# Patient Record
Sex: MALE | Race: WHITE | Employment: FULL TIME | URBAN - METROPOLITAN AREA
[De-identification: names, ages, dates, MRNs, and addresses within clinical notes are randomized per-mention and may not be internally consistent; named-entity substitution may affect disease eponyms.]

---

## 2021-11-18 ENCOUNTER — APPOINTMENT (OUTPATIENT)
Dept: GENERAL RADIOLOGY | Age: 51
DRG: 177 | End: 2021-11-18
Payer: COMMERCIAL

## 2021-11-18 ENCOUNTER — HOSPITAL ENCOUNTER (INPATIENT)
Age: 51
LOS: 1 days | Discharge: HOME | DRG: 177 | End: 2021-11-19
Attending: STUDENT IN AN ORGANIZED HEALTH CARE EDUCATION/TRAINING PROGRAM | Admitting: INTERNAL MEDICINE
Payer: COMMERCIAL

## 2021-11-18 DIAGNOSIS — J96.01 ACUTE RESPIRATORY FAILURE WITH HYPOXIA (HCC): Primary | ICD-10-CM

## 2021-11-18 DIAGNOSIS — U07.1 PNEUMONIA DUE TO COVID-19 VIRUS: ICD-10-CM

## 2021-11-18 DIAGNOSIS — J12.82 PNEUMONIA DUE TO COVID-19 VIRUS: ICD-10-CM

## 2021-11-18 PROBLEM — Z99.89 OSA ON CPAP: Status: ACTIVE | Noted: 2021-11-18

## 2021-11-18 PROBLEM — I10 PRIMARY HYPERTENSION: Status: ACTIVE | Noted: 2021-11-18

## 2021-11-18 PROBLEM — E66.01 CLASS 3 SEVERE OBESITY DUE TO EXCESS CALORIES WITH SERIOUS COMORBIDITY AND BODY MASS INDEX (BMI) OF 45.0 TO 49.9 IN ADULT (HCC): Status: ACTIVE | Noted: 2021-11-18

## 2021-11-18 PROBLEM — G47.33 OSA ON CPAP: Status: ACTIVE | Noted: 2021-11-18

## 2021-11-18 LAB
A/G RATIO: 1 (ref 1.1–2.2)
ALBUMIN SERPL-MCNC: 3.6 G/DL (ref 3.4–5)
ALP BLD-CCNC: 77 U/L (ref 40–129)
ALT SERPL-CCNC: 50 U/L (ref 10–40)
ANION GAP SERPL CALCULATED.3IONS-SCNC: 12 MMOL/L (ref 3–16)
AST SERPL-CCNC: 46 U/L (ref 15–37)
BASE EXCESS VENOUS: 4.3 MMOL/L (ref -3–3)
BASOPHILS ABSOLUTE: 0 K/UL (ref 0–0.2)
BASOPHILS RELATIVE PERCENT: 0.5 %
BILIRUB SERPL-MCNC: 0.5 MG/DL (ref 0–1)
BUN BLDV-MCNC: 17 MG/DL (ref 7–20)
C-REACTIVE PROTEIN: 15.3 MG/L (ref 0–5.1)
CALCIUM SERPL-MCNC: 8.6 MG/DL (ref 8.3–10.6)
CARBOXYHEMOGLOBIN: 1.3 % (ref 0–1.5)
CHLORIDE BLD-SCNC: 100 MMOL/L (ref 99–110)
CO2: 26 MMOL/L (ref 21–32)
CREAT SERPL-MCNC: 0.7 MG/DL (ref 0.9–1.3)
EKG ATRIAL RATE: 102 BPM
EKG DIAGNOSIS: NORMAL
EKG P AXIS: 21 DEGREES
EKG P-R INTERVAL: 152 MS
EKG Q-T INTERVAL: 374 MS
EKG QRS DURATION: 100 MS
EKG QTC CALCULATION (BAZETT): 487 MS
EKG R AXIS: 71 DEGREES
EKG T AXIS: 27 DEGREES
EKG VENTRICULAR RATE: 102 BPM
EOSINOPHILS ABSOLUTE: 0 K/UL (ref 0–0.6)
EOSINOPHILS RELATIVE PERCENT: 0.2 %
GFR AFRICAN AMERICAN: >60
GFR NON-AFRICAN AMERICAN: >60
GLUCOSE BLD-MCNC: 109 MG/DL (ref 70–99)
HCO3 VENOUS: 28.8 MMOL/L (ref 23–29)
HCT VFR BLD CALC: 53.1 % (ref 40.5–52.5)
HEMOGLOBIN: 17.2 G/DL (ref 13.5–17.5)
INFLUENZA A: NOT DETECTED
INFLUENZA B: NOT DETECTED
LACTIC ACID: 0.9 MMOL/L (ref 0.4–2)
LYMPHOCYTES ABSOLUTE: 1.3 K/UL (ref 1–5.1)
LYMPHOCYTES RELATIVE PERCENT: 28.6 %
MAGNESIUM: 2 MG/DL (ref 1.8–2.4)
MCH RBC QN AUTO: 28.4 PG (ref 26–34)
MCHC RBC AUTO-ENTMCNC: 32.4 G/DL (ref 31–36)
MCV RBC AUTO: 87.4 FL (ref 80–100)
METHEMOGLOBIN VENOUS: 0.3 %
MONOCYTES ABSOLUTE: 0.7 K/UL (ref 0–1.3)
MONOCYTES RELATIVE PERCENT: 14.3 %
NEUTROPHILS ABSOLUTE: 2.6 K/UL (ref 1.7–7.7)
NEUTROPHILS RELATIVE PERCENT: 56.4 %
O2 SAT, VEN: 92 %
O2 THERAPY: ABNORMAL
PCO2, VEN: 42.3 MMHG (ref 40–50)
PDW BLD-RTO: 15.1 % (ref 12.4–15.4)
PH VENOUS: 7.45 (ref 7.35–7.45)
PLATELET # BLD: 163 K/UL (ref 135–450)
PMV BLD AUTO: 8.6 FL (ref 5–10.5)
PO2, VEN: 58.7 MMHG (ref 25–40)
POTASSIUM REFLEX MAGNESIUM: 3.4 MMOL/L (ref 3.5–5.1)
PRO-BNP: 23 PG/ML (ref 0–124)
RBC # BLD: 6.07 M/UL (ref 4.2–5.9)
SARS-COV-2 RNA, RT PCR: DETECTED
SODIUM BLD-SCNC: 138 MMOL/L (ref 136–145)
TCO2 CALC VENOUS: 30 MMOL/L
TOTAL PROTEIN: 7.1 G/DL (ref 6.4–8.2)
TROPONIN: <0.01 NG/ML
WBC # BLD: 4.7 K/UL (ref 4–11)

## 2021-11-18 PROCEDURE — 93010 ELECTROCARDIOGRAM REPORT: CPT | Performed by: INTERNAL MEDICINE

## 2021-11-18 PROCEDURE — G0378 HOSPITAL OBSERVATION PER HR: HCPCS

## 2021-11-18 PROCEDURE — 96374 THER/PROPH/DIAG INJ IV PUSH: CPT

## 2021-11-18 PROCEDURE — 93005 ELECTROCARDIOGRAM TRACING: CPT | Performed by: PHYSICIAN ASSISTANT

## 2021-11-18 PROCEDURE — 71045 X-RAY EXAM CHEST 1 VIEW: CPT

## 2021-11-18 PROCEDURE — 83880 ASSAY OF NATRIURETIC PEPTIDE: CPT

## 2021-11-18 PROCEDURE — 6370000000 HC RX 637 (ALT 250 FOR IP): Performed by: NURSE PRACTITIONER

## 2021-11-18 PROCEDURE — 80053 COMPREHEN METABOLIC PANEL: CPT

## 2021-11-18 PROCEDURE — 84484 ASSAY OF TROPONIN QUANT: CPT

## 2021-11-18 PROCEDURE — 6360000002 HC RX W HCPCS: Performed by: NURSE PRACTITIONER

## 2021-11-18 PROCEDURE — 2580000003 HC RX 258: Performed by: NURSE PRACTITIONER

## 2021-11-18 PROCEDURE — 36415 COLL VENOUS BLD VENIPUNCTURE: CPT

## 2021-11-18 PROCEDURE — 83605 ASSAY OF LACTIC ACID: CPT

## 2021-11-18 PROCEDURE — 85025 COMPLETE CBC W/AUTO DIFF WBC: CPT

## 2021-11-18 PROCEDURE — 82803 BLOOD GASES ANY COMBINATION: CPT

## 2021-11-18 PROCEDURE — 99282 EMERGENCY DEPT VISIT SF MDM: CPT

## 2021-11-18 PROCEDURE — 6360000002 HC RX W HCPCS: Performed by: PHYSICIAN ASSISTANT

## 2021-11-18 PROCEDURE — 87636 SARSCOV2 & INF A&B AMP PRB: CPT

## 2021-11-18 PROCEDURE — 1200000000 HC SEMI PRIVATE

## 2021-11-18 PROCEDURE — 99221 1ST HOSP IP/OBS SF/LOW 40: CPT | Performed by: NURSE PRACTITIONER

## 2021-11-18 PROCEDURE — 86140 C-REACTIVE PROTEIN: CPT

## 2021-11-18 PROCEDURE — 83735 ASSAY OF MAGNESIUM: CPT

## 2021-11-18 PROCEDURE — 96372 THER/PROPH/DIAG INJ SC/IM: CPT

## 2021-11-18 RX ORDER — SODIUM CHLORIDE 0.9 % (FLUSH) 0.9 %
5-40 SYRINGE (ML) INJECTION EVERY 12 HOURS SCHEDULED
Status: DISCONTINUED | OUTPATIENT
Start: 2021-11-18 | End: 2021-11-19 | Stop reason: HOSPADM

## 2021-11-18 RX ORDER — ONDANSETRON 4 MG/1
4 TABLET, ORALLY DISINTEGRATING ORAL EVERY 8 HOURS PRN
Status: DISCONTINUED | OUTPATIENT
Start: 2021-11-18 | End: 2021-11-19 | Stop reason: HOSPADM

## 2021-11-18 RX ORDER — DEXAMETHASONE SODIUM PHOSPHATE 10 MG/ML
6 INJECTION INTRAMUSCULAR; INTRAVENOUS ONCE
Status: COMPLETED | OUTPATIENT
Start: 2021-11-18 | End: 2021-11-18

## 2021-11-18 RX ORDER — ACETAMINOPHEN 650 MG/1
650 SUPPOSITORY RECTAL EVERY 6 HOURS PRN
Status: DISCONTINUED | OUTPATIENT
Start: 2021-11-18 | End: 2021-11-19 | Stop reason: HOSPADM

## 2021-11-18 RX ORDER — SODIUM CHLORIDE 0.9 % (FLUSH) 0.9 %
5-40 SYRINGE (ML) INJECTION PRN
Status: DISCONTINUED | OUTPATIENT
Start: 2021-11-18 | End: 2021-11-19 | Stop reason: HOSPADM

## 2021-11-18 RX ORDER — DEXAMETHASONE SODIUM PHOSPHATE 10 MG/ML
6 INJECTION INTRAMUSCULAR; INTRAVENOUS EVERY 24 HOURS
Status: DISCONTINUED | OUTPATIENT
Start: 2021-11-19 | End: 2021-11-19 | Stop reason: HOSPADM

## 2021-11-18 RX ORDER — GUAIFENESIN/DEXTROMETHORPHAN 100-10MG/5
5 SYRUP ORAL EVERY 4 HOURS PRN
Status: DISCONTINUED | OUTPATIENT
Start: 2021-11-18 | End: 2021-11-19 | Stop reason: HOSPADM

## 2021-11-18 RX ORDER — POLYETHYLENE GLYCOL 3350 17 G/17G
17 POWDER, FOR SOLUTION ORAL DAILY PRN
Status: DISCONTINUED | OUTPATIENT
Start: 2021-11-18 | End: 2021-11-19 | Stop reason: HOSPADM

## 2021-11-18 RX ORDER — ONDANSETRON 2 MG/ML
4 INJECTION INTRAMUSCULAR; INTRAVENOUS EVERY 6 HOURS PRN
Status: DISCONTINUED | OUTPATIENT
Start: 2021-11-18 | End: 2021-11-19 | Stop reason: HOSPADM

## 2021-11-18 RX ORDER — POTASSIUM CHLORIDE 20 MEQ/1
20 TABLET, EXTENDED RELEASE ORAL ONCE
Status: COMPLETED | OUTPATIENT
Start: 2021-11-18 | End: 2021-11-18

## 2021-11-18 RX ORDER — SODIUM CHLORIDE 9 MG/ML
25 INJECTION, SOLUTION INTRAVENOUS PRN
Status: DISCONTINUED | OUTPATIENT
Start: 2021-11-18 | End: 2021-11-19 | Stop reason: HOSPADM

## 2021-11-18 RX ORDER — ACETAMINOPHEN 325 MG/1
650 TABLET ORAL EVERY 6 HOURS PRN
Status: DISCONTINUED | OUTPATIENT
Start: 2021-11-18 | End: 2021-11-19 | Stop reason: HOSPADM

## 2021-11-18 RX ADMIN — ENOXAPARIN SODIUM 40 MG: 100 INJECTION SUBCUTANEOUS at 16:41

## 2021-11-18 RX ADMIN — Medication 10 ML: at 20:43

## 2021-11-18 RX ADMIN — ENOXAPARIN SODIUM 40 MG: 100 INJECTION SUBCUTANEOUS at 20:42

## 2021-11-18 RX ADMIN — DEXAMETHASONE SODIUM PHOSPHATE 6 MG: 10 INJECTION INTRAMUSCULAR; INTRAVENOUS at 12:01

## 2021-11-18 RX ADMIN — POTASSIUM CHLORIDE 20 MEQ: 20 TABLET, EXTENDED RELEASE ORAL at 13:05

## 2021-11-18 ASSESSMENT — PAIN SCALES - GENERAL: PAINLEVEL_OUTOF10: 0

## 2021-11-18 NOTE — H&P
Hospital Medicine History & Physical      PCP: Chaz Mann, APRN - CNP    Date of Admission: 11/18/2021    Date of Service: Pt seen/examined on 11/18/21      Chief Complaint:    Chief Complaint   Patient presents with    Shortness of Breath     Pt ambulatory via triage, c/o SOB x 3 days s/p Covid symptoms x 2 weeks. Pt states he never tested positive but his spouse was diagnosed and he suffered same symptoms. History Of Present Illness: The patient is a 46 y.o. male with PMH of HTN and EVIE who presents to [facility] with complaints of shortness of breath. History obtained from the patient and review of EMR. Pt stated that he was exposed to Annabelle[employer]ially via his girlfriend and he has been quarantining at home for almost 2 weeks. He stated 3 days ago he developed intermittent non productive cough and shortness breath with activity. He checked pulse ox at home and noticed it dropping to 89% on room air. He stated that he is usually very active and has increased shortness of breath with exertion. He has a productive cough with clear sputum and general malaise. He denies any chest pain, palpitations, dizziness, orthopnea, abdominal pain, nausea, or vomiting. He stated he did have a couple episodes of loose stool yesterday. In ED pt COVID positive, CXR with multifocal pneumonia involving both lungs. His pulse ox dropped to 89% with ambulation. K+ 3.4, replacement ordered and elevated liver enzymes. Pt was admitted to  for observation. Past Medical History:        Diagnosis Date    COVID 11/18/2021    Hypertension     Kidney stone     Knee pain        Past Surgical History:        Procedure Laterality Date    HERNIA REPAIR         Medications Prior to Admission:    Prior to Admission medications    Medication Sig Start Date End Date Taking?  Authorizing Provider   hydrochlorothiazide (HYDRODIURIL) 25 MG tablet Take 25 mg by mouth daily   Yes Historical Provider, MD   ondansetron [facility] 4 MG tablet Take 1 tablet by mouth every 8 hours as needed for Nausea 12/27/15   Jj Domingo APRN - CNP   ondansetron (ZOFRAN ODT) 4 MG disintegrating tablet Take 1-2 tablets by mouth every 12 hours as needed for Nausea May Sub regular tablet (non-ODT) if insurance does not cover ODT. 11/29/15   Howard Purdy PA-C       Allergies:  Amoxicillin and Penicillins    Social History:  The patient currently lives home    TOBACCO:   reports that he has never smoked. He has never used smokeless tobacco.  ETOH:   reports current alcohol use. Family History:   Positive as follows:    History reviewed. No pertinent family history. REVIEW OF SYSTEMS:       Constitutional: Negative for fever   HENT: Negative for sore throat   Eyes: Negative for redness   Respiratory: Negative  for dyspnea, cough   Cardiovascular: Negative for chest pain   Gastrointestinal: Negative for vomiting, diarrhea   Genitourinary: Negative for hematuria   Musculoskeletal: Negative for arthralgias   Skin: Negative for rash   Neurological: Negative for syncope   Hematological: Negative for adenopathy   Psychiatric/Behavorial: Negative for anxiety    PHYSICAL EXAM:    /72   Pulse 101   Temp 98.5 °F (36.9 °C) (Oral)   Resp 16   Ht 6' 2\" (1.88 m)   Wt (!) 368 lb (166.9 kg)   SpO2 94%   BMI 47.25 kg/m²     Gen: No distress. Alert. Eyes: PERRL. No sclera icterus. No conjunctival injection. ENT: No discharge. Pharynx clear. Neck: No JVD. No Carotid Bruit. Trachea midline. Resp: No accessory muscle use. No crackles. No wheezes. No rhonchi. CV: Regular rate. Regular rhythm. No murmur. No rub. No edema. GI: Non-tender. Non-distended. No masses. No organomegaly. Normal bowel sounds. No hernia. Skin: Warm and dry. No nodule on exposed extremities. No rash on exposed extremities. M/S: No cyanosis. No joint deformity. No clubbing. Neuro: Awake. Grossly nonfocal    Psych: Oriented x 3. No anxiety or agitation.      CBC: Recent Labs     11/18/21  1100   WBC 4.7   HGB 17.2   HCT 53.1*   MCV 87.4        BMP:   Recent Labs     11/18/21  1100      K 3.4*      CO2 26   BUN 17   CREATININE 0.7*     LIVER PROFILE:   Recent Labs     11/18/21  1100   AST 46*   ALT 50*   BILITOT 0.5   ALKPHOS 77        CARDIAC ENZYMES  Recent Labs     11/18/21  1100   TROPONINI <0.01       CULTURES  SARS-CoV-2 RNA, RT PCR NOT DETECTED DETECTED          EKG:    Sinus tachycardia with Premature supraventricular complexesIncomplete right bundle branch blockNonspecific ST & T wave changesConfirmed by Irena Young MD, ANA ([id number]) on 11/18/2021 6:19:49 PM    RADIOLOGY  XR CHEST PORTABLE   Final Result   Multifocal pneumonia involving both lungs, nonspecific but highly suspicious   for COVID pneumonia. Active Problems:    Pneumonia due to COVID-19 virus  Resolved Problems:    * No resolved hospital problems. *        ASSESSMENT/PLAN:  COVID PNA  Acute Hypoxic Respiratory Failure   - CXR: shows multifocal pneumonia involving   - no home O2 at baseline, 89% with exertion 92% at rest on room air  -  Pt is unvaccinated  - Admitted to , droplet +, tele, cont pulse ox  - supp O2 as needed, wean as tolerated   - Decadron D#1, PRN Robitussin  - monitor     Hypokalemia  - 3.4, replaced with 20 mEq x1    HTN  - controlled   - held HCTZ on admission  - monitor    EVIE  -CPAP at home, has not been compliant     Morbid Obesity  - Body mass index is 47.25 kg/m². - Complicating assessment and treatment. Placing patient at risk for multiple co-morbidities as well as early death and contributing to the patient's presentation.   - Counseled on weight loss. DVT Prophylaxis: Lovenox   Diet: ADULT DIET;  Regular  Code Status: Full Code    SYEDA Pa - CNP

## 2021-11-18 NOTE — ACP (ADVANCE CARE PLANNING)
Advance Care Planning   Healthcare Decision Maker:    Primary Decision Maker: Monica Garduno - [phone]    Click here to complete Healthcare Decision Makers including selection of the Healthcare Decision Maker Relationship (ie \"Primary\").

## 2021-11-18 NOTE — ED PROVIDER NOTES
I independently examined and evaluated Bree Ventura. All diagnostic, treatment, and disposition decisions were made by myself in conjunction with the advanced practice provider. For all further details of the patient's emergency department visit, please see the advanced practice provider's documentation. Primary Care Physician: SYEDA Koenig - CNP    History: This is a 46 y.o. male who presents to the Emergency Department with complaint of shortness of breath, cough. Covid positive. Please has been symptomatic for last 3 days, exposed by significant other at home. Patient is desatted into upper 80s at home and with exertion. No chest pain no abdominal pain no nausea no vomiting. Physical:     height is 6' 2\" (1.88 m) and weight is 368 lb (166.9 kg) (abnormal). His oral temperature is 98.5 °F (36.9 °C). His blood pressure is 124/76 and his pulse is 101. His respiration is 16 and oxygen saturation is 94%.    46 y.o. male   Alert oriented  Heart tachycardic regular  Lungs slightly diminished likely due to body habitus otherwise clear  Abdomen soft nontender    Impression: Covid    Plan: Eval for possible admission        CRITICAL CARE: There was a high probability of clinically significant/life threatening deterioration in this patient's condition which required my urgent intervention. Total critical care time was 0 minutes. This excludes any time for separately reportable procedures. Connor Miranda DO  Emergency Physician        Comment: Please note this report has been produced using speech recognition software and may contain errors related to that system including errors in grammar, punctuation, and spelling, as well as words and phrases that may be inappropriate. If there are any questions or concerns please feel free to contact the dictating provider for clarification.           Connor Miranda DO  11/18/21 9925

## 2021-11-18 NOTE — ED PROVIDER NOTES
[address] ED  EMERGENCY DEPARTMENT ENCOUNTER        Pt Name: Kofi Gilmroe  MRN: [id number]  [place] 1970  Date of evaluation: 11/18/2021  Provider: JAMES Rosenberg  PCP: SYEDA Patino - CNP    This patient was seen and evaluated by the attending physician Tr Arriola DO. I have evaluated this patient. My supervising physician was available for consultation. CHIEF COMPLAINT       Chief Complaint   Patient presents with    Shortness of Breath     Pt ambulatory via triage, c/o SOB x 3 days s/p Covid symptoms x 2 weeks. Pt states he never tested positive but his spouse was diagnosed and he suffered same symptoms. HISTORY OF PRESENT ILLNESS   (Location/Symptom, Timing/Onset, Context/Setting, Quality, Duration, Modifying Factors, Severity)  Note limiting factors. Kofi Gilmore is a 46 y.o. male with past medical history of hypertension who presents via private vehicle from his home for evaluation of shortness of breath. Patient notes that his girlfriend became infected with COVID-19 about 2 weeks ago. He has been home quarantining for the last 2 weeks, around 2 weeks ago he had a couple episodes of loose or more watery stool however that is resolved. 3 days ago he developed intermittent nonproductive cough and shortness of breath with activity. He endorses some nasal congestion as well but currently denies fevers nausea vomiting abdominal pain headache sore throat or current diarrhea. He has been taking NyQuil at home with intermittent improvement in his symptoms. He has not been vaccinated against Covid or influenza. He has not tested positive for Covid, he has not tested at all. He presents today because his girlfriend was checking his pulse ox at home and he was desatting to 89% on room air. He has past medical history of obstructive sleep apnea and wears CPAP at night but endorses some noncompliance with that.      Nursing Notes were all reviewed and agreed with or any disagreements were addressed  in the HPI. Pt was seen during the [place] 19 pandemic. Appropriate PPE worn by ME during patient encounters. Pt seen during a time with constrained hospital bed capacity and other potential inpatient and outpatient resources were constrained due to the viral pandemic. REVIEW OF SYSTEMS    (2-9 systems for level 4, 10 or more for level 5)     Review of Systems    Positives and Pertinent negatives as per HPI. Except as noted abovein the ROS, all other systems were reviewed and negative. PAST MEDICAL HISTORY     Past Medical History:   Diagnosis Date    Hypertension     Kidney stone     Knee pain          SURGICAL HISTORY     Past Surgical History:   Procedure Laterality Date    HERNIA REPAIR           CURRENTMEDICATIONS       Previous Medications    HYDROCHLOROTHIAZIDE (HYDRODIURIL) 25 MG TABLET    Take 25 mg by mouth daily    ONDANSETRON (ZOFRAN ODT) 4 MG DISINTEGRATING TABLET    Take 1-2 tablets by mouth every 12 hours as needed for Nausea May Sub regular tablet (non-ODT) if insurance does not cover ODT. ONDANSETRON (ZOFRAN) 4 MG TABLET    Take 1 tablet by mouth every 8 hours as needed for Nausea         ALLERGIES     Amoxicillin and Penicillins    FAMILYHISTORY     No family history on file.        SOCIAL HISTORY       Social History     Socioeconomic History    Marital status:      Spouse name: Not on file    Number of children: Not on file    Years of education: Not on file    Highest education level: Not on file   Occupational History    Not on file   Tobacco Use    Smoking status: Never Smoker    Smokeless tobacco: Not on file   Substance and Sexual Activity    Alcohol use: Yes     Comment: rarely    Drug use: No    Sexual activity: Yes     Partners: Female   Other Topics Concern    Not on file   Social History Narrative    Not on file     Social Determinants of Health     Financial Resource Strain:     Difficulty of Paying Living Expenses: Not on file   Food Insecurity:     Worried About Running Out of Food in the Last Year: Not on file    Ran Out of Food in the Last Year: Not on file   Transportation Needs:     Lack of Transportation (Medical): Not on file    Lack of Transportation (Non-Medical): Not on file   Physical Activity:     Days of Exercise per Week: Not on file    Minutes of Exercise per Session: Not on file   Stress:     Feeling of Stress : Not on file   Social Connections:     Frequency of Communication with Friends and Family: Not on file    Frequency of Social Gatherings with Friends and Family: Not on file    Attends [employer] Services: Not on file    Active Member of [address] [place] [address] [employer] or Organizations: Not on file    Attends Club or Organization Meetings: Not on file    Marital Status: Not on file   Intimate Partner Violence:     Fear of Current or Ex-Partner: Not on file    Emotionally Abused: Not on file    Physically Abused: Not on file    Sexually Abused: Not on file   Housing Stability:     Unable to Pay for Housing in the Last Year: Not on file    Number of [place] in the Last Year: Not on file    Unstable Housing in the Last Year: Not on file       SCREENINGS             PHYSICAL EXAM    (up to 7 for level 4, 8 or more for level 5)     ED Triage Vitals [11/18/21 1004]   BP Temp Temp Source Pulse Resp SpO2 Height Weight   (!) 136/97 98.5 °F (36.9 °C) Oral 101 16 91 % 6' 2\" (1.88 m) (!) 368 lb (166.9 kg)       Physical Exam  PHYSICAL EXAM  /76   Pulse 101   Temp 98.5 °F (36.9 °C) (Oral)   Resp 16   Ht 6' 2\" (1.88 m)   Wt (!) 368 lb (166.9 kg)   SpO2 94%   BMI 47.25 kg/m²   GENERAL APPEARANCE: Awake and alert. Cooperative. Adult male sitting upright in exam bed, nondiaphoretic, breathing comfortably on room air, showing no sign of acute respiratory distress. HEAD: Normocephalic. Atraumatic. EYES: PERRL. EOM's grossly intact. ENT: Mucous membranes are dry. Roberto Coronado NECK: Supple. No gross JVD. HEART: RRR. No murmurs. Radial pulses 2+ symmetric, PT pulses 1+, symmetric  LUNGS: Respirations unlabored. Faint crackles at the bases bilaterally. Good air exchange. Speaking comfortably in full sentences. ABDOMEN: Soft. Non-distended. Non-tender. No guarding or rebound. EXTREMITIES: No peripheral edema. Moves all extremities equally. All extremities neurovascularly intact. SKIN: Warm and dry. No acute rashes. NEUROLOGICAL: Alert and oriented. CN grossly intact. No gross facial drooping. Power intact to UE and LE, sensation intact x 4. No tremors or ataxia. Gait intact. PSYCHIATRIC: Normal mood and affect.     DIAGNOSTIC RESULTS   LABS:    Labs Reviewed   COVID-19 & INFLUENZA COMBO - Abnormal; Notable for the following components:       Result Value    SARS-CoV-2 RNA, RT PCR DETECTED (*)     All other components within normal limits    Narrative:     Performed at:  [facility],  [place] [facility]   Phone [phone]   CBC WITH AUTO DIFFERENTIAL - Abnormal; Notable for the following components:    RBC 6.07 (*)     Hematocrit 53.1 (*)     All other components within normal limits    Narrative:     Performed at:  [facility],  [place] [facility]   Phone [phone]   COMPREHENSIVE METABOLIC PANEL W/ REFLEX TO MG FOR LOW K - Abnormal; Notable for the following components:    Potassium reflex Magnesium 3.4 (*)     Glucose 109 (*)     CREATININE 0.7 (*)     Albumin/Globulin Ratio 1.0 (*)     ALT 50 (*)     AST 46 (*)     All other components within normal limits    Narrative:     Performed at:  [facility],  [employer]   Phone [phone]   BLOOD GAS, VENOUS - Abnormal; Notable for the following components:    pH, [place] 7.451 (*)     pO2, [place] 58.7 (*)     Base Excess, [place] 4.3 (*)     All other components within normal limits    Narrative:     Performed at:  [facility],  [place], [facility]   Phone [phone]   TROPONIN    Narrative:     Performed at:  [facility],  [place], [facility]   Phone [phone]   BRAIN NATRIURETIC PEPTIDE    Narrative:     Performed at:  [facility],  [place], [facility]   Phone [phone]   LACTIC ACID, PLASMA    Narrative:     Performed at:  [facility],  [place], [facility]   Phone [phone]   MAGNESIUM    Narrative:     Performed at:  [facility]) [facility],  [place], [facility]   Phone [phone]       All other labs were within normal range or not returned as of this dictation. EKG: All EKG's are interpreted by the Emergency Department Physician who either signs orCo-signs this chart in the absence of a cardiologist.  Please see their note for interpretation of EKG. RADIOLOGY:   Non-plain film images such as CT, Ultrasound and MRI are read by the radiologist. Plain radiographic images are visualized andpreliminarily interpreted by the  ED Provider with the below findings:        Interpretation perthe Radiologist below, if available at the time of this note:    XR CHEST PORTABLE   Final Result   Multifocal pneumonia involving both lungs, nonspecific but highly suspicious   for COVID pneumonia. XR CHEST PORTABLE    Result Date: 11/18/2021  EXAMINATION: ONE XRAY VIEW OF THE CHEST 11/18/2021 10:26 am COMPARISON: None. HISTORY: ORDERING SYSTEM PROVIDED HISTORY: sob TECHNOLOGIST PROVIDED HISTORY: Reason for exam:->sob Reason for Exam: Shortness of Breath (Pt ambulatory via triage, c/o SOB x 3 days s/p Covid symptoms x 2 weeks.   Pt states he never tested positive but his spouse was diagnosed and he suffered same symptoms Acuity: Unknown Type of Exam: Unknown FINDINGS: Bilateral ill-defined pulmonary opacities are present. No pneumothorax or pleural effusion. No evidence of acute process of the cardiac or mediastinal structures     Multifocal pneumonia involving both lungs, nonspecific but highly suspicious for COVID pneumonia. PROCEDURES   Unless otherwise noted below, none     Procedures    CRITICAL CARE TIME   The total critical care time spent while evaluating and treating this patient was 15 minutes. This excludes time spent doing separately billable procedures. This includes time at the bedside, data interpretation, medication management, obtaining critical history from collateral sources if the patient is unable to provide it directly, and physician consultation. Specifics of interventions taken and potentially life-threatening diagnostic considerations are listed above in the medical decision making. CONSULTS:  IP CONSULT TO HOSPITALIST      EMERGENCY DEPARTMENT COURSE and DIFFERENTIALDIAGNOSIS/MDM:   Vitals:    Vitals:    11/18/21 1023 11/18/21 1114 11/18/21 1149 11/18/21 1153   BP:  124/76     Pulse:       Resp:       Temp:       TempSrc:       SpO2: 93% 93% 93% 94%   Weight:       Height:           Patient was given thefollowing medications:  Medications   dexamethasone (DECADRON) injection 6 mg (6 mg IntraVENous Given 11/18/21 1201)       PDMP Monitoring:    Last PDMP Bill as Reviewed [facility]):  Review User Review Instant Review Result          Last Controlled Substance Monitoring Documentation      ED from 12/27/2015 in [facility] ED   Periodic Controlled Substance Monitoring No signs of potential drug abuse or diversion identified. filed at 12/27/2015 1657        Urine Drug Screenings (1 yr)    No resulted procedures found. Medication Contract and Consent for Opioid Use Documents Filed      No documents found                MDM:   Patient seen and evaluated.  Old records reviewed. Diagnostic testing reviewed and results discussed. 59-year-old male presents for evaluation of shortness of breath in context of suspected COVID-19 infection. He was diagnosed with and confirmed to be positive for Covid in the department today. He did ambulate and desatted however he recovered and is stable on room air but satting low, 90 to 93% on room air. Patient started on dexamethasone for respiratory failure and will likely require admission. Chest x-ray reveals bilateral pneumonia consistent with COVID-19 pneumonia. Overall I have low concern for acute PE as he is not complaining of pleuritic chest pain. All information including ED workup, results, treatment, diagnosis has been reviewed and discussed with ED attending physician and directly discussed with Hospitalist who is the admitting physician. Pt will be admitted in stable condition. Pt advised of admission and is in full agreement. Discharge Time out:  CC Reviewed Yes   Test Results Yes     Vitals:    11/18/21 1153   BP:    Pulse:    Resp:    Temp:    SpO2: 94%              FINAL IMPRESSION      1. Acute respiratory failure with hypoxia ([address]    2. Pneumonia due to COVID-19 virus          DISPOSITION/PLAN   DISPOSITION        PATIENT REFERREDTO:  No follow-up provider specified.     DISCHARGE MEDICATIONS:  New Prescriptions    No medications on file       DISCONTINUED MEDICATIONS:  Discontinued Medications    No medications on file              (Please note that portions ofthis note were completed with a voice recognition program.  Efforts were made to edit the dictations but occasionally words are mis-transcribed.)    Agustin Byrne (electronically signed)        Agustin Byrne  11/18/21 8897

## 2021-11-18 NOTE — LETTER
[address] [id number]  Phone: [phone]             November 19, 2021    Patient: Sharon Lazo   YOB: 1970   Date of Visit: 11/18/2021       To Whom It May Concern:    Rina Marvin was seen and treated in our facility  beginning 11/18/2021 until {DISCHARGE ALVD:[id number]}. He my return to work 11/29/21.       Sincerely,       Rhonda Del Rosario RN         Signature:__________________________________

## 2021-11-18 NOTE — CARE COORDINATION
Case Management Assessment  Initial Evaluation      Patient Name: Bree Ventura  YOB: 1970  Diagnosis: Acute respiratory failure with hypoxia ([facility] [J96.01]  Pneumonia due to COVID-19 virus [U07.1, J12.82]  Date / Time: 11/18/2021 10:10 AM    Admission status/Date: 11/18/21 inpt  Chart Reviewed: Yes      Patient Interviewed: Yes   Family Interviewed:  No      Hospitalization in the last 30 days:  No      Health Care Decision Maker :   Primary Decision Maker: Tristan Grewal  [phone]    (CM - must 1st enter selection under Navigator - emergency contact- Nathalyn Relationship and pick relationship)   Who do you trust or have selected to make healthcare decisions for you      Met with: spoke with pt via phone d/t covid  Interview conducted  (bedside/phone):    Current PCP:  [address] required for SNF : Y         3 night stay required -  [employer]  Support Systems/Care Needs:    Transportation: self    Meal Preparation: self    Housing  Living Arrangements:  Lives 1 story home  Steps: 5  Intent for return to present living arrangements: Yes  Identified Issues: [address] with [address] : No Agency:(Services)     Passport/Waiver : No  :                      Phone Number:    Passport/Waiver Services: N/A          Durable Medical Equiptment   DME Provider:   Equipment:   Walker___Cane___RTS___ BSC___Shower Chair___Hospital Bed___W/C____Other________  02 at ____Liter(s)---wears(frequency)_______ HHN ___ CPAP_x__ BiPap___   N/A____      Home O2 Use :  No    If No for home O2---if presently on O2 during hospitalization:  No  if yes CM to follow for potential DC O2 need  Informed of need for care provider to bring portable home O2 tank on day of discharge for nursing to connect prior to leaving:   Not Indicated  Verbalized agreement/Understanding:   Not Indicated    Community Service Affiliation  Dialysis:  No · Agency:  · Location:  · Dialysis Schedule:  · Phone:   · Fax: Other Community Services: (ex:PT/OT,Mental Health,Wound Clinic, Cardio/Pul [address]) None    DISCHARGE PLAN: Explained Case Management role/services. Reviewed chart and spoke with pt via phone. Role of CM explained. States lives home with S.O. and plan return. States is fully IPTA. Noted is on RA at rest but drops to 89% with activity. Will follow for poss home O2.

## 2021-11-18 NOTE — PLAN OF CARE
Admit to 2w, telemetry and continuous pulse ox for observation    COVID PNA  desat with exertion to 89%, recovered to 90-93%  Decadron   Hx of HTN, Obese, and EVIE    Angelina Liu, APRN - CNP

## 2021-11-19 VITALS
OXYGEN SATURATION: 95 % | TEMPERATURE: 99 F | SYSTOLIC BLOOD PRESSURE: 157 MMHG | BODY MASS INDEX: 40.43 KG/M2 | WEIGHT: 315 LBS | RESPIRATION RATE: 16 BRPM | DIASTOLIC BLOOD PRESSURE: 100 MMHG | HEART RATE: 92 BPM | HEIGHT: 74 IN

## 2021-11-19 LAB
A/G RATIO: 1 (ref 1.1–2.2)
ALBUMIN SERPL-MCNC: 3.5 G/DL (ref 3.4–5)
ALP BLD-CCNC: 78 U/L (ref 40–129)
ALT SERPL-CCNC: 45 U/L (ref 10–40)
ANION GAP SERPL CALCULATED.3IONS-SCNC: 13 MMOL/L (ref 3–16)
AST SERPL-CCNC: 36 U/L (ref 15–37)
BASOPHILS ABSOLUTE: 0 K/UL (ref 0–0.2)
BASOPHILS RELATIVE PERCENT: 0.3 %
BILIRUB SERPL-MCNC: 0.5 MG/DL (ref 0–1)
BUN BLDV-MCNC: 16 MG/DL (ref 7–20)
C-REACTIVE PROTEIN: 10 MG/L (ref 0–5.1)
CALCIUM SERPL-MCNC: 9.1 MG/DL (ref 8.3–10.6)
CHLORIDE BLD-SCNC: 104 MMOL/L (ref 99–110)
CO2: 24 MMOL/L (ref 21–32)
CREAT SERPL-MCNC: 0.6 MG/DL (ref 0.9–1.3)
EOSINOPHILS ABSOLUTE: 0 K/UL (ref 0–0.6)
EOSINOPHILS RELATIVE PERCENT: 0 %
GFR AFRICAN AMERICAN: >60
GFR NON-AFRICAN AMERICAN: >60
GLUCOSE BLD-MCNC: 120 MG/DL (ref 70–99)
HCT VFR BLD CALC: 51.6 % (ref 40.5–52.5)
HEMOGLOBIN: 17.2 G/DL (ref 13.5–17.5)
LYMPHOCYTES ABSOLUTE: 1.2 K/UL (ref 1–5.1)
LYMPHOCYTES RELATIVE PERCENT: 25.8 %
MCH RBC QN AUTO: 29.3 PG (ref 26–34)
MCHC RBC AUTO-ENTMCNC: 33.4 G/DL (ref 31–36)
MCV RBC AUTO: 87.7 FL (ref 80–100)
MONOCYTES ABSOLUTE: 0.7 K/UL (ref 0–1.3)
MONOCYTES RELATIVE PERCENT: 14.5 %
NEUTROPHILS ABSOLUTE: 2.7 K/UL (ref 1.7–7.7)
NEUTROPHILS RELATIVE PERCENT: 59.4 %
PDW BLD-RTO: 15.3 % (ref 12.4–15.4)
PLATELET # BLD: 171 K/UL (ref 135–450)
PMV BLD AUTO: 8.5 FL (ref 5–10.5)
POTASSIUM REFLEX MAGNESIUM: 4.1 MMOL/L (ref 3.5–5.1)
RBC # BLD: 5.88 M/UL (ref 4.2–5.9)
SODIUM BLD-SCNC: 141 MMOL/L (ref 136–145)
TOTAL PROTEIN: 7 G/DL (ref 6.4–8.2)
WBC # BLD: 4.6 K/UL (ref 4–11)

## 2021-11-19 PROCEDURE — 80053 COMPREHEN METABOLIC PANEL: CPT

## 2021-11-19 PROCEDURE — 36415 COLL VENOUS BLD VENIPUNCTURE: CPT

## 2021-11-19 PROCEDURE — 96372 THER/PROPH/DIAG INJ SC/IM: CPT

## 2021-11-19 PROCEDURE — 6360000002 HC RX W HCPCS: Performed by: INTERNAL MEDICINE

## 2021-11-19 PROCEDURE — 86140 C-REACTIVE PROTEIN: CPT

## 2021-11-19 PROCEDURE — 99239 HOSP IP/OBS DSCHRG MGMT >30: CPT | Performed by: INTERNAL MEDICINE

## 2021-11-19 PROCEDURE — 90686 IIV4 VACC NO PRSV 0.5 ML IM: CPT | Performed by: INTERNAL MEDICINE

## 2021-11-19 PROCEDURE — G0378 HOSPITAL OBSERVATION PER HR: HCPCS

## 2021-11-19 PROCEDURE — 85025 COMPLETE CBC W/AUTO DIFF WBC: CPT

## 2021-11-19 PROCEDURE — G0008 ADMIN INFLUENZA VIRUS VAC: HCPCS | Performed by: INTERNAL MEDICINE

## 2021-11-19 RX ORDER — DEXAMETHASONE 6 MG/1
6 TABLET ORAL
Qty: 9 TABLET | Refills: 0 | Status: SHIPPED | OUTPATIENT
Start: 2021-11-19 | End: 2021-11-28

## 2021-11-19 RX ADMIN — INFLUENZA A VIRUS A/VICTORIA/2570/2019 IVR-215 (H1N1) ANTIGEN (PROPIOLACTONE INACTIVATED), INFLUENZA A VIRUS A/CAMBODIA/E0826360/2020 IVR-224 (H3N2) ANTIGEN (PROPIOLACTONE INACTIVATED), INFLUENZA B VIRUS B/VICTORIA/705/2018 BVR-11 ANTIGEN (PROPIOLACTONE INACTIVATED), INFLUENZA B VIRUS B/PHUKET/3073/2013 BVR-1B ANTIGEN (PROPIOLACTONE INACTIVATED) 0.5 ML: 15; 15; 15; 15 INJECTION, SUSPENSION INTRAMUSCULAR at 09:40

## 2021-11-19 NOTE — DISCHARGE SUMMARY
Name:  Ernestina Harper  Room:  9015/2658-38  MRN:    [id number]    Discharge Summary      This discharge summary is in conjunction with a complete physical exam done on the day of discharge. Discharging Physician: Hetal Galo MD      Admit: 11/18/2021  Discharge:   11/19/2021     Diagnoses this Admission    Active Problems:    Pneumonia due to COVID-19 virus    Acute respiratory failure with hypoxia (HCC)    Class 3 severe obesity due to excess calories with serious comorbidity and body mass index (BMI) of 45.0 to 49.9 in adult (HCC)    EVIE on CPAP    Primary hypertension    Hypoxia  Resolved Problems:    * No resolved hospital problems. *      Procedures (Please Review Full Report for Details)  none    Consults    IP CONSULT TO HOSPITALIST      HPI:    The patient is a 46 y.o. male with PMH of HTN and EVIE who presents to [facility] with complaints of shortness of breath. History obtained from the patient and review of EMR. Pt stated that he was exposed to [employer] via his girlfriend and he has been quarantining at home for almost 2 weeks. He stated 3 days ago he developed intermittent non productive cough and shortness breath with activity. He checked pulse ox at home and noticed it dropping to 89% on room air. He stated that he is usually very active and has increased shortness of breath with exertion. He has a productive cough with clear sputum and general malaise. He denies any chest pain, palpitations, dizziness, orthopnea, abdominal pain, nausea, or vomiting. He stated he did have a couple episodes of loose stool yesterday.      In ED pt COVID positive, CXR with multifocal pneumonia involving both lungs. His pulse ox dropped to 89% with ambulation. K+ 3.4, replacement ordered and elevated liver enzymes.   Pt was admitted to  for observation.         Physical Exam at Discharge:  /70   Pulse 68   Temp 99.9 °F (37.7 °C) (Oral)   Resp 16   Ht 6' 2\" (1.88 m)   Wt (!) 368 lb (166.9 kg) discharge. CBC:   Recent Labs     11/18/21  1100 11/19/21  0602   WBC 4.7 4.6   HGB 17.2 17.2   HCT 53.1* 51.6   MCV 87.4 87.7    171     BMP:   Recent Labs     11/18/21  1100 11/19/21  0602    141   K 3.4* 4.1    104   CO2 26 24   BUN 17 16   CREATININE 0.7* 0.6*     LIVER PROFILE:   Recent Labs     11/18/21  1100 11/19/21  0602   AST 46* 36   ALT 50* 45*   BILITOT 0.5 0.5   ALKPHOS 77 78   Results for Jeannine Garnett (MRN [id number]) as of 11/19/2021 07:52   Ref. Range 11/18/2021 11:00   Lactic Acid Latest Ref Range: 0.4 - 2.0 mmol/L 0.9       CULTURES  SARS-CoV-2 RNA, RT PCR NOT DETECTED DETECTED             XR CHEST PORTABLE   Final Result   Multifocal pneumonia involving both lungs, nonspecific but highly suspicious   for COVID pneumonia. Discharge Medications     Medication List      START taking these medications    dexamethasone 6 MG tablet  Commonly known as: Decadron  Take 1 tablet by mouth daily (with breakfast) for 9 days        CONTINUE taking these medications    hydroCHLOROthiazide 25 MG tablet  Commonly known as: HYDRODIURIL     ondansetron 4 MG disintegrating tablet  Commonly known as: Zofran ODT  Take 1-2 tablets by mouth every 12 hours as needed for Nausea May Sub regular tablet (non-ODT) if insurance does not cover ODT. STOP taking these medications    ondansetron 4 MG tablet  Commonly known as: Zofran           Where to Get Your Medications      These medications were sent to [address], [address] [place] [address], [address] [address]    Phone: [phone]   · dexamethasone 6 MG tablet           Discharge Condition/Location: Stable    Follow Up: Follow up with PCP.     More than 30 mts spent    Nguyễn Hanson MD 11/19/2021 7:50 AM

## 2021-11-19 NOTE — PROGRESS NOTES
Handoff report and transfer of care given at bedside to [facility]. Patient in stable condition, denies needs/concerns at this time. Call light within reach.

## 2021-11-22 ENCOUNTER — CARE COORDINATION (OUTPATIENT)
Dept: CASE MANAGEMENT | Age: 51
End: 2021-11-22

## 2021-11-22 NOTE — CARE COORDINATION
questions and concerns and agrees to contact the CTN or health care provider for questions related to their healthcare. Reviewed and educated patient on any new and changed medications related to discharge diagnosis. Was patient discharged with a pulse oximeter? Yes CTN discussed pulse oximeter instructions and when to notify provider or seek emergency care. Reports SaO2 95% RA. Taking dexamethasone as ordered. Feels well. Has PCP appt tomorrow. Denies needs/concerns at this time. CTN provided contact information for future needs. Plan for follow-up call in 7-14 days based on severity of symptoms and risk factors.     Jenny Cheema RN  Care Transitions Nurse  [phone] mobile

## 2021-11-30 ENCOUNTER — CARE COORDINATION (OUTPATIENT)
Dept: CASE MANAGEMENT | Age: 51
End: 2021-11-30

## 2021-11-30 NOTE — CARE COORDINATION
Patient contacted regarding COVID-19 diagnosis. COVID-19 Detected on 11/18/2021. Care Transition Nurse contacted the patient by telephone to perform follow-up assessment. Symptoms reviewed with patient who verbalized the following symptoms: fatigue, cough, no new symptoms and no worsening symptoms. Due to no new or worsening symptoms encounter was not routed to provider for escalation. Educated patient about risk for severe COVID-19 due to risk factors according to CDC guidelines. CTN reviewed medical action plan and red flag symptoms with patient who verbalized understanding. Discussed COVID vaccination status No. Education provided on COVID-19 vaccination as appropriate. Discussed exposure protocols and quarantine with CDC Guidelines. Patient was given an opportunity to verbalize any questions and concerns and agrees to contact the CTN or health care provider for questions related to their healthcare. Was patient discharged with a pulse oximeter? Yes. CTN reviewed pulse oximeter instructions and when to notify provider or seek emergency care. Reports he is back to work. SaO2 maintaining >95% RA. Still with poor endurance and fatigue. works outside 6 days/week and looking forward to getting back to his normal. Still with cough. Denies needs/concerns at this time. Encouraged him to contact CTN or provider for any needs/concerns. He completed OV with his PCP and has another follow up prior to [place] with CXR. CTN provided contact information for future reference. No further follow-up call identified based on severity of symptoms and risk factors. Shell Garcia RN  Care Transition Nurse  [phone] mobile    No future appointments.

## 2022-08-29 ENCOUNTER — APPOINTMENT (OUTPATIENT)
Dept: CT IMAGING | Age: 52
End: 2022-08-29
Payer: COMMERCIAL

## 2022-08-29 ENCOUNTER — HOSPITAL ENCOUNTER (EMERGENCY)
Age: 52
Discharge: HOME | End: 2022-08-29
Attending: EMERGENCY MEDICINE
Payer: COMMERCIAL

## 2022-08-29 VITALS
HEART RATE: 82 BPM | OXYGEN SATURATION: 98 % | SYSTOLIC BLOOD PRESSURE: 115 MMHG | RESPIRATION RATE: 18 BRPM | HEIGHT: 73 IN | BODY MASS INDEX: 41.75 KG/M2 | TEMPERATURE: 98.7 F | WEIGHT: 315 LBS | DIASTOLIC BLOOD PRESSURE: 72 MMHG

## 2022-08-29 DIAGNOSIS — R42 VERTIGO: Primary | ICD-10-CM

## 2022-08-29 LAB
A/G RATIO: 1.6 (ref 1.1–2.2)
ALBUMIN SERPL-MCNC: 4.5 G/DL (ref 3.4–5)
ALP BLD-CCNC: 74 U/L (ref 40–129)
ALT SERPL-CCNC: 58 U/L (ref 10–40)
ANION GAP SERPL CALCULATED.3IONS-SCNC: 10 MMOL/L (ref 3–16)
AST SERPL-CCNC: 33 U/L (ref 15–37)
BASOPHILS ABSOLUTE: 0.1 K/UL (ref 0–0.2)
BASOPHILS RELATIVE PERCENT: 0.8 %
BILIRUB SERPL-MCNC: 0.7 MG/DL (ref 0–1)
BUN BLDV-MCNC: 25 MG/DL (ref 7–20)
CALCIUM SERPL-MCNC: 9.3 MG/DL (ref 8.3–10.6)
CHLORIDE BLD-SCNC: 107 MMOL/L (ref 99–110)
CO2: 28 MMOL/L (ref 21–32)
CREAT SERPL-MCNC: 0.7 MG/DL (ref 0.9–1.3)
EKG ATRIAL RATE: 61 BPM
EKG DIAGNOSIS: NORMAL
EKG P AXIS: 13 DEGREES
EKG P-R INTERVAL: 150 MS
EKG Q-T INTERVAL: 428 MS
EKG QRS DURATION: 114 MS
EKG QTC CALCULATION (BAZETT): 430 MS
EKG R AXIS: 67 DEGREES
EKG T AXIS: 46 DEGREES
EKG VENTRICULAR RATE: 61 BPM
EOSINOPHILS ABSOLUTE: 0.1 K/UL (ref 0–0.6)
EOSINOPHILS RELATIVE PERCENT: 1 %
GFR AFRICAN AMERICAN: >60
GFR NON-AFRICAN AMERICAN: >60
GLUCOSE BLD-MCNC: 92 MG/DL (ref 70–99)
HCT VFR BLD CALC: 52.2 % (ref 40.5–52.5)
HEMOGLOBIN: 17.4 G/DL (ref 13.5–17.5)
LYMPHOCYTES ABSOLUTE: 1.9 K/UL (ref 1–5.1)
LYMPHOCYTES RELATIVE PERCENT: 19.6 %
MCH RBC QN AUTO: 29.3 PG (ref 26–34)
MCHC RBC AUTO-ENTMCNC: 33.3 G/DL (ref 31–36)
MCV RBC AUTO: 87.8 FL (ref 80–100)
MONOCYTES ABSOLUTE: 0.9 K/UL (ref 0–1.3)
MONOCYTES RELATIVE PERCENT: 9.2 %
NEUTROPHILS ABSOLUTE: 6.7 K/UL (ref 1.7–7.7)
NEUTROPHILS RELATIVE PERCENT: 69.4 %
PDW BLD-RTO: 15.7 % (ref 12.4–15.4)
PLATELET # BLD: 236 K/UL (ref 135–450)
PMV BLD AUTO: 7.9 FL (ref 5–10.5)
POTASSIUM REFLEX MAGNESIUM: 4 MMOL/L (ref 3.5–5.1)
RBC # BLD: 5.95 M/UL (ref 4.2–5.9)
SODIUM BLD-SCNC: 145 MMOL/L (ref 136–145)
TOTAL PROTEIN: 7.4 G/DL (ref 6.4–8.2)
TROPONIN: <0.01 NG/ML
WBC # BLD: 9.6 K/UL (ref 4–11)

## 2022-08-29 PROCEDURE — 70450 CT HEAD/BRAIN W/O DYE: CPT

## 2022-08-29 PROCEDURE — 99285 EMERGENCY DEPT VISIT HI MDM: CPT

## 2022-08-29 PROCEDURE — 84484 ASSAY OF TROPONIN QUANT: CPT

## 2022-08-29 PROCEDURE — 2580000003 HC RX 258: Performed by: EMERGENCY MEDICINE

## 2022-08-29 PROCEDURE — 85025 COMPLETE CBC W/AUTO DIFF WBC: CPT

## 2022-08-29 PROCEDURE — 70498 CT ANGIOGRAPHY NECK: CPT

## 2022-08-29 PROCEDURE — 6360000004 HC RX CONTRAST MEDICATION: Performed by: EMERGENCY MEDICINE

## 2022-08-29 PROCEDURE — 80053 COMPREHEN METABOLIC PANEL: CPT

## 2022-08-29 PROCEDURE — 96361 HYDRATE IV INFUSION ADD-ON: CPT

## 2022-08-29 PROCEDURE — 6360000002 HC RX W HCPCS: Performed by: EMERGENCY MEDICINE

## 2022-08-29 PROCEDURE — 36415 COLL VENOUS BLD VENIPUNCTURE: CPT

## 2022-08-29 PROCEDURE — 96374 THER/PROPH/DIAG INJ IV PUSH: CPT

## 2022-08-29 PROCEDURE — 93010 ELECTROCARDIOGRAM REPORT: CPT | Performed by: INTERNAL MEDICINE

## 2022-08-29 PROCEDURE — 6370000000 HC RX 637 (ALT 250 FOR IP): Performed by: EMERGENCY MEDICINE

## 2022-08-29 PROCEDURE — 93005 ELECTROCARDIOGRAM TRACING: CPT | Performed by: EMERGENCY MEDICINE

## 2022-08-29 RX ORDER — 0.9 % SODIUM CHLORIDE 0.9 %
1000 INTRAVENOUS SOLUTION INTRAVENOUS ONCE
Status: COMPLETED | OUTPATIENT
Start: 2022-08-29 | End: 2022-08-29

## 2022-08-29 RX ORDER — ONDANSETRON 4 MG/1
4 TABLET, FILM COATED ORAL 3 TIMES DAILY PRN
Qty: 15 TABLET | Refills: 0 | Status: SHIPPED | OUTPATIENT
Start: 2022-08-29

## 2022-08-29 RX ORDER — MECLIZINE HYDROCHLORIDE 25 MG/1
25 TABLET ORAL ONCE
Status: COMPLETED | OUTPATIENT
Start: 2022-08-29 | End: 2022-08-29

## 2022-08-29 RX ORDER — MECLIZINE HCL 12.5 MG/1
25 TABLET ORAL 3 TIMES DAILY PRN
Qty: 15 TABLET | Refills: 0 | Status: SHIPPED | OUTPATIENT
Start: 2022-08-29 | End: 2022-09-08

## 2022-08-29 RX ORDER — ONDANSETRON 2 MG/ML
4 INJECTION INTRAMUSCULAR; INTRAVENOUS ONCE
Status: COMPLETED | OUTPATIENT
Start: 2022-08-29 | End: 2022-08-29

## 2022-08-29 RX ADMIN — IOPAMIDOL 75 ML: 755 INJECTION, SOLUTION INTRAVENOUS at 12:33

## 2022-08-29 RX ADMIN — MECLIZINE HYDROCHLORIDE 25 MG: 25 TABLET ORAL at 11:28

## 2022-08-29 RX ADMIN — ONDANSETRON HYDROCHLORIDE 4 MG: 2 INJECTION, SOLUTION INTRAMUSCULAR; INTRAVENOUS at 11:31

## 2022-08-29 RX ADMIN — SODIUM CHLORIDE 1000 ML: 9 INJECTION, SOLUTION INTRAVENOUS at 11:28

## 2022-08-29 ASSESSMENT — LIFESTYLE VARIABLES: HOW OFTEN DO YOU HAVE A DRINK CONTAINING ALCOHOL: NEVER

## 2022-08-29 ASSESSMENT — PAIN - FUNCTIONAL ASSESSMENT: PAIN_FUNCTIONAL_ASSESSMENT: NONE - DENIES PAIN

## 2022-08-29 NOTE — ED NOTES
AVS provided and reviewed with the patient. The patient verbalized understanding of care at home, follow up care, and emergent symptoms to return for. No questions or concerns verbalized at this time. The patient is alert, oriented, stable, and ambulatory out of the department at the time of discharge.        Lindsey Vincent RN  08/29/22 [id number]

## 2022-08-29 NOTE — ED PROVIDER NOTES
[facility] Name: Ade Almanza  MRN: [id number]  [place] 1970  Date of evaluation: 8/29/2022  Provider: Evans Prince MD    CHIEF COMPLAINT       Chief Complaint   Patient presents with    Dizziness         HISTORY OF PRESENT ILLNESS   (Location/Symptom, Timing/Onset, Context/Setting, Quality, Duration, Modifying Factors, Severity)  Note limiting factors. Ade Almanza is a 46 y.o. male with past medical history of morbid obesity, obstructive sleep apnea, hypertension here today with dizziness. The patient states that 3 days ago he was at work when he went to lie down on his back slide under a car to perform a mechanical repair. He states he was immediately hit with acute onset dizziness. States he felt like everything was spinning. States this made him quite nauseous and he vomited once. Since that time he said continued mild dizziness. Notes it is not as bad as it was but is still present. Associated with a very mild 1/10 throbbing discomfort in his bilateral temples. No recent head injury or trauma. Certainly not worst headache of life. No speech or vision changes. No numbness, tingling or weakness in his extremities. No gait instability. States he continues to have some dizziness sometimes worse with lying backwards. No recent runny nose, nasal congestion or sore throat. No earache. HPI    Nursing Notes were reviewed. REVIEW OF SYSTEMS    (2-9 systems for level 4, 10 or more for level 5)     Review of Systems    Please see HPI for pertinent positive and negative review of system findings. A full 10 system ROS was performed and otherwise negative.         PAST MEDICAL HISTORY     Past Medical History:   Diagnosis Date    COVID 11/18/2021    Hypertension     Kidney stone     Knee pain          SURGICAL HISTORY       Past Surgical History:   Procedure Laterality Date    HERNIA REPAIR           CURRENT MEDICATIONS Previous Medications    HYDROCHLOROTHIAZIDE (HYDRODIURIL) 25 MG TABLET    Take 25 mg by mouth daily       ALLERGIES     Amoxicillin and Penicillins    FAMILY HISTORY     History reviewed. No pertinent family history. SOCIAL HISTORY       Social History     Socioeconomic History    Marital status:      Spouse name: None    Number of children: None    Years of education: None    Highest education level: None   Tobacco Use    Smoking status: Never    Smokeless tobacco: Never   Substance and Sexual Activity    Alcohol use: Yes     Comment: rarely    Drug use: No    Sexual activity: Yes     Partners: Female       SCREENINGS   NIH Stroke Scale  Interval: Baseline  Level of Consciousness (1a): Alert  LOC Questions (1b): Answers both correctly  LOC Commands (1c): Performs both tasks correctly  Best Gaze (2): Normal  Visual (3): No visual loss  Facial Palsy (4): Normal symmetrical movement  Motor Arm, Left (5a): No drift  Motor Arm, Right (5b): No drift  Motor Leg, Left (6a): No drift  Motor Leg, Right (6b): No drift  Limb Ataxia (7): Absent  Sensory (8): Normal  Best Language (9): No aphasia  Dysarthria (10): Normal  Extinction and Inattention (11): No abnormality  Total: 0Glasgow Coma Scale  Eye Opening: Spontaneous  Best Verbal Response: Oriented  Best Motor Response: Obeys commands  Madonna Coma Scale Score: 15          PHYSICAL EXAM    (up to 7 for level 4, 8 or more for level 5)     ED Triage Vitals [08/29/22 1105]   BP Temp Temp Source Heart Rate Resp SpO2 Height Weight   (!) 138/93 98.7 °F (37.1 °C) Oral 73 18 96 % 6' 1\" (1.854 m) (!) 370 lb (167.8 kg)       Physical Exam    General appearance:  Cooperative. No acute distress. Skin:  Warm. Dry. Eye:  Extraocular movements intact. Pupils are equally round and reactive to light and accommodation. CN II-XII intact. No nystagmus  Ears, nose, mouth and throat:  Oral mucosa moist, tympanic membranes clear bilaterally  Neck:  Trachea midline. Heart:  Regular rate and rhythm  Perfusion:  intact  Respiratory:  Lungs clear to auscultation bilaterally. Respirations nonlabored. Abdominal:   Non distended. Nontender  Neurological:  Alert and oriented x 3. Moves all extremities spontaneously. Intact sensation throughout. Intact finger-to-nose bilaterally. No drift in the upper or lower extremities. Gait normal.  2+ bilateral patellar reflexes  Musculoskeletal:   Normal ROM, no deformities          Psychiatric:  Normal mood      DIAGNOSTIC RESULTS       Labs Reviewed   CBC WITH AUTO DIFFERENTIAL - Abnormal; Notable for the following components:       Result Value    RBC 5.95 (*)     RDW 15.7 (*)     All other components within normal limits   COMPREHENSIVE METABOLIC PANEL W/ REFLEX TO MG FOR LOW K - Abnormal; Notable for the following components:    BUN 25 (*)     Creatinine 0.7 (*)     ALT 58 (*)     All other components within normal limits   TROPONIN       Interpretation per the Radiologist below, if obtained/available at the time of this note:    CTA HEAD NECK W CONTRAST   Final Result   1. No dissection or flow limiting stenosis of the cervical carotid/vertebral   arteries. 2. No significant stenosis of the [id number]. CT Head W/O Contrast   Final Result   No acute intracranial abnormality. All other labs/imaging were within normal range or not returned as of this dictation. EMERGENCY DEPARTMENT COURSE and DIFFERENTIAL DIAGNOSIS/MDM:   Vitals:    Vitals:    08/29/22 1105 08/29/22 1247 08/29/22 1324   BP: (!) 138/93 127/74 115/72   Pulse: 73 71 82   Resp: 18 16 18   Temp: 98.7 °F (37.1 °C)     TempSrc: Oral     SpO2: 96% 96% 98%   Weight: (!) 370 lb (167.8 kg)     Height: 6' 1\" (1.854 m)         EKG: Sinus rhythm rate of 61 bpm.  Incomplete bundle branch block. No ST elevation. Patient presents to the emergency department today complaining of dizziness for last 3 days. Associated with a very mild headache. Low suspicion for subarachnoid hemorrhage. Despite his complaints of some mild dizziness normal neurologic exam.  Normal vitals. No obvious acute findings for stroke. Given associated headache head CT was performed to rule out any obvious mass and was negative. CTA also negative with no vascular disease. Overall suspicion for acute stroke low. Suspect likely peripheral etiology. Given meclizine here and has had near complete symptomatic resolution feeling much improved. Plan to discharge home with instructions to follow-up with PCP or return for worsening    I, Jacques Ward M.D., am the primary clinician of record. MDM      CONSULTS     None    Critical Care:   None    REASSESSMENT          PROCEDURE     Unless otherwise noted below, none     Procedures      FINAL IMPRESSION      1. Vertigo            DISPOSITION/PLAN   DISPOSITION Decision To Discharge 08/29/2022 01:34:36 PM        PATIENT REFERRED TO:  SYEDA Cheng - [address]  [phone]    Schedule an appointment as soon as possible for a visit       DISCHARGE MEDICATIONS:  New Prescriptions    MECLIZINE (ANTIVERT) 12.5 MG TABLET    Take 2 tablets by mouth 3 times daily as needed for Dizziness    ONDANSETRON (ZOFRAN) 4 MG TABLET    Take 1 tablet by mouth 3 times daily as needed for Nausea or Vomiting     Controlled Substances Monitoring:     RX Monitoring 12/27/2015   Periodic Controlled Substance Monitoring No signs of potential drug abuse or diversion identified.        (Please note that portions of this note were completed with a voice recognition program.  Efforts were made to edit the dictations but occasionally words are mis-transcribed.)    Jacques Ward MD (electronically signed)  Attending Emergency Physician            Mandy John MD  08/29/22 8767